# Patient Record
(demographics unavailable — no encounter records)

---

## 2024-10-18 NOTE — DISCUSSION/SUMMARY
[Reviewed Clinical Lab Test(s)] : Results of clinical tests were reviewed. [Reviewed Radiology Report(s)] : Radiology reports were reviewed. [Reviewed Radiology Film/Image(s)] : Images from radiology studies were reviewed and examined. [Discuss Alternatives/Risks/Benefits w/Patient] : All alternatives, risks, and benefits were discussed with the patient/family and all questions were answered.  Patient expressed good understanding and appreciates the importance of follow up as recommended. [FreeTextEntry1] : Stage IVB uterine adenocarcinoma s/p x6 cycles of carbo/taxol/dostarlimab with plan for maintenace dostarlimab. Recent perforated appendix s/p oral antibiotic course and  laparoscopic appendectomy 04/25/24.   [] Continue dostarlimab 1000mg q6 weeks  [] Labs taken today [] CT C/A/P to evaluate response to treatment  A1C and thyroid function repeated in preparation with Dr. Griffith next week. TSH elevated last lab draw

## 2024-10-18 NOTE — HISTORY OF PRESENT ILLNESS
[FreeTextEntry1] : Problem 1) Endometrioid Adenocarcinoma G3 of the cervix or uterus (clinically suspicious for cervical cancer), stage IVB 5/2023     a) PD-L1 Interpretation: Negative    b) MSI-H MLH1 : Loss of nuclear expression  MSH2 :  Intact nuclear expression  MSH6:  Intact nuclear expression  PMS2 : Loss of nuclear expression   Previous Treatment 1) Pelvic US 3/23/23    a) Uterus 7cm with two small myomas noted EMS 9mm    b) R adnexal mass near right ovary 5.3cm   2) Cervical biopsy 3/29/23    a) Endometrioid adenocarcinoma, FIGO G3 unable to determine primary cervical versus uterine  3) PET/CT 4/19/23   a) Intense hypermetabolic activity in the cervix and uterus, whicih are enlarged and irregular on CT imaged. Activity in cervix and uterus is inseparable from sigmoid colon. Air in uterine cavity concerning for fistula formation between uterus./cervix and bowel   b) Hypermetabolic complicated bilateral adnexal cysts c/w malignancy   c) Hypermetabolic bilateral pelvic and retroperitoneal lymphadenopathy c/ w mets * L hydronephrosis and hydroureter   d) Hypermetabolic peritoneal nodules    e) hypermetabolic liver lesions   f) Multiple bilateral pulmonary nodules larger of which are hypermetabolic  4) Saw Dr Mccarty for L Hydronephrosis and Hydroureter (obsrevation)   5) Cisplatin, Paclitaxel, Avastin started 5/4/23     a) Taxol reaction - given very slowly per Allergy Dr. Lee    b) Changed to Carboplatin with C6 due to kidney issues    c) Good response on imaging after 6 cycles - plan to change to Carbo/Taxol/Dostarlimab  6) Pet/CT 7/14/23     a) Since prior 4/2023 pet ct scan, there is positive response to therapy involving the locally advanced primary gyn malignancy, metastatic LN, peritoneal carcinomatosis.      b) Left sided hydronephrosis and hydroureter is seen again slightly decreased in caliber     c) no evidence of new sites of systematic involvement in the rest of the body 7) CT C/A/P  9/15/23    a) Slight interval decrease in size of pulmonary nodules, retroperitoneal and pelvic lymph nodes. Stable cervical tumor and peritoneal nodules.     b) new thickening of the bladder wall and several small foci of air within the bladder  8) Taxol/Carbo/Dostarlimab started 9/20/23 s/p 6 cycles 3/24 9) CT C/A/P 11/22/23 (After 3 cycles T/C/Dostarlimab)  1.  Pelvic lesion as described above; possible cervical/lower uterine segment neoplasm.(3.2 previously now 2.6cm)  2.  Indeterminate pulmonary nodules; cannot exclude metastasis, particularly on the largest lesion. (Decreased on AA comparison, 1.5, 1.3, 1.5, 1.2cm previously now 1.1, 5mm, 6mm, 1.2cm) 3.  Left periaortic adenopathy, suspicious for metastasis (1.8cm previously now 1.1cm)  4.  Anterior and lateral bladder wall thickening with a midline 6 mm bladder polypoid lesion; recommend direct visualization to exclude a neoplasm. 10) CT C/A/P 3/18/24   a) Perforated appendicitis with suspected developing pericecal abscess. Since November 22, 2023, slightly decreased retrocervical lesion. Slightly decreased pulmonary nodule in right upper lobe. Stable retroperitoneal lymphadenopathy. Stable indeterminate right renal lesion. 11) Perforated appendix 3/24 treated with IV antibioitics  12) Appendectomy with Dr. Osman 4/25/24 13) Dostarlimab maintenance 5/21/24 to present 14) CTAP 8/30/24    a) Enlarging retrocervical space lesion Decreasing pulmonary nodule Less prominent left periaortic lymph node. Again seen is an apparent polyp in the urinary bladder. 15) PET/CT 9/20/24    a) 1. Since 7/13/2023, decrease in size and increase in uptake at the mass at the posterior uterine body, SUV max 19.4, consistent with known malignancy. No FDG avid retrocervical mass is identified. 2. New focal uptake within gastric wall at the proximal greater curvature of the stomach, SUV max 5.5, probably representing metastatic disease. 3. Decrease in size and uptake of an FDG avid left periaortic node, most likely representing metastatic disease. 4. Persistent uptake in a 1.2 cm right breast nodule. Further evaluation dedicated breast imaging is recommended. 5. Focal uptake at the posterior elements of L4-L5, most likely degenerative in nature. 16)  Endoscopy with biopsies 9/26/24   a) H.pylori 17) Diagnostic mammogram and biopsy R 10/11/24    a) Fibroadenoma  Here for follow up on maintenance dostarlimab. Cr 2.2 10/11/24 up from baseline of 1.5-1.9 Feeling well. Bowel/bladder habits at baseline. Has no side effects from dostarlimab. Is gaining weight even though she reports decreased appetite .

## 2024-10-18 NOTE — REVIEW OF SYSTEMS
[Negative] : Musculoskeletal [Neuropathy] : neuropathy [Fatigue] : fatigue [FreeTextEntry2] : slight numbness/tingling in both feet [FreeTextEntry7] : Overall 15lb weight loss over the course of chemo d/t loss of appetite. Fatigue improving.

## 2024-10-18 NOTE — PHYSICAL EXAM
[Chaperone Present] : A chaperone was present in the examining room during all aspects of the physical examination [Normal] : Recto-Vaginal Exam: Normal [de-identified] : soft, NT/ND. Incisions c/d/i. [de-identified] : Minimal tenderness to palpation at RLQ [Restricted in physically strenuous activity but ambulatory and able to carry out work of a light or sedentary nature] : Status 1- Restricted in physically strenuous activity but ambulatory and able to carry out work of a light or sedentary nature, e.g., light house work, office work

## 2024-10-28 NOTE — HISTORY OF PRESENT ILLNESS
[FreeTextEntry1] : 58y F with PMH of HTN, DM, hypothyroidism and stage IV B endometrial adenocarcinoma G3 from the cervix or uterus with residual CKD presents for initial evaluation. At the present time, denies active complaints such as CP, SOB, N/V/D, dizziness, lightheadiness, dysuria, hematuria, fever or chills. No recent visits to the adult ER or hospital admissions reported in the last 2 weeks. No new medications started in the last 2 weeks either.   With regards to renal hx, denies family hx of renal disease, hx of nephrolithiasis, hx of CKD or OTC use.   With regards to renal function, SCr trend has been fluctuating since 2023 between 1.03-1.4 with GFR between 40-55% with most recent labs showing SCR at 2.21 and GFR at 25%.

## 2024-10-28 NOTE — PHYSICAL EXAM
[General Appearance - Alert] : alert [Strabismus] : no strabismus was seen [Hearing Threshold Finger Rub Not [place]] : hearing was normal [Neck Cervical Mass (___cm)] : no neck mass was observed [Jugular Venous Distention Increased] : there was no jugular-venous distention [Exaggerated Use Of Accessory Muscles For Inspiration] : no accessory muscle use [Auscultation Breath Sounds / Voice Sounds] : lungs were clear to auscultation bilaterally [Heart Sounds] : normal S1 and S2 [Heart Sounds Gallop] : no gallops [Murmurs] : no murmurs [Edema] : there was no peripheral edema [Bowel Sounds] : normal bowel sounds [Abdomen Soft] : soft [No CVA Tenderness] : no ~M costovertebral angle tenderness [No Focal Deficits] : no focal deficits [Oriented To Time, Place, And Person] : oriented to person, place, and time

## 2024-10-28 NOTE — ASSESSMENT
[FreeTextEntry1] : 1. Residual CKD possibly related to HTN associated nephrosclerosis vs DM nephropathy vs recurrent episodes of MAURILIO (hx of hydronephrosis vs platinum induced MAURILIO)  Patient was started on Cisplatin, Paclitaxel, Avastin started 5/4/23 then she was changed to carboplatin due to episodes of MAURILIO  (C6) New regiment since then Carbo/Taxol/Dostarlimab During Oncology course, she developed L hydronephrosis and then perforates appendicitis with pericecal abscess  Since May 2024 she has been on maintenance Dostarlimab.  At that time, SCR was 1.41 and GFR at 43% with fluctuations noted since then. Most recent labs showed SCr at 2.21 and GFR at 25% She was sent to the adult ER for IVF ~ with no changes on renal function compared to most recent labs. Based on review of home medications, she was taking PPI. PPI and ICPI both could potentially increase the risk for AIN which could be a possibility. However, there is no UA at present to determine if there is sterile pyuria or hematuria.  She has proteinuria however she also has DM.  Will repeat labs today to monitor renal function and include UA, new UPCR, new UACR. She has a 24hr collection showing proteinuria of 418mg/g These medications can be associated with a variety of glomerular diseases such as IgA nephropathy, membranous nephropathy, FSGS, C3GN, and minimal change disease and they are most commonly associated with ATIN/ AIN Recommend holding Metformin PPI, ARB and ICPI If renal function does not improve or worsens with next set of labs ~ will likely need a renal biopsy to rule out ICPI-associated MAURILIO   2. HTN - BP at goal  BP regiment:  Valsartan 40 MG daily C/w BP check ups at home Will hold ARB for now and start CCB, nifedipine 60mg daily since the patient has MAURILIO   3. DM - will need to be transition to an alternative antihypoglycemic agent Once she recovers from MAURILIO ~ could potentially benefit from kerendia or SGLT2-i   4. RTC w/ labs

## 2024-11-11 NOTE — PHYSICAL EXAM
[Chaperone Present] : A chaperone was present in the examining room during all aspects of the physical examination [Normal] : No focal neurologic defects observed [Restricted in physically strenuous activity but ambulatory and able to carry out work of a light or sedentary nature] : Status 1- Restricted in physically strenuous activity but ambulatory and able to carry out work of a light or sedentary nature, e.g., light house work, office work [de-identified] : soft, excoriation marks on the lateral abdomen, no specific rash seen

## 2024-11-11 NOTE — HISTORY OF PRESENT ILLNESS
[FreeTextEntry1] : Problem 1) Endometrioid Adenocarcinoma G3 of the cervix or uterus (clinically suspicious for cervical cancer), stage IVB 5/2023     a) PD-L1 Interpretation: Negative    b) MSI-H MLH1 : Loss of nuclear expression  MSH2 :  Intact nuclear expression  MSH6:  Intact nuclear expression  PMS2 : Loss of nuclear expression   Previous Treatment 1) Pelvic US 3/23/23    a) Uterus 7cm with two small myomas noted EMS 9mm    b) R adnexal mass near right ovary 5.3cm   2) Cervical biopsy 3/29/23    a) Endometrioid adenocarcinoma, FIGO G3 unable to determine primary cervical versus uterine  3) PET/CT 4/19/23   a) Intense hypermetabolic activity in the cervix and uterus, whicih are enlarged and irregular on CT imaged. Activity in cervix and uterus is inseparable from sigmoid colon. Air in uterine cavity concerning for fistula formation between uterus./cervix and bowel   b) Hypermetabolic complicated bilateral adnexal cysts c/w malignancy   c) Hypermetabolic bilateral pelvic and retroperitoneal lymphadenopathy c/ w mets * L hydronephrosis and hydroureter   d) Hypermetabolic peritoneal nodules    e) hypermetabolic liver lesions   f) Multiple bilateral pulmonary nodules larger of which are hypermetabolic  4) Saw Dr Mccarty for L Hydronephrosis and Hydroureter (obsrevation)   5) Cisplatin, Paclitaxel, Avastin started 5/4/23     a) Taxol reaction - given very slowly per Allergy Dr. Lee    b) Changed to Carboplatin with C6 due to kidney issues    c) Good response on imaging after 6 cycles - plan to change to Carbo/Taxol/Dostarlimab  6) Pet/CT 7/14/23     a) Since prior 4/2023 pet ct scan, there is positive response to therapy involving the locally advanced primary gyn malignancy, metastatic LN, peritoneal carcinomatosis.      b) Left sided hydronephrosis and hydroureter is seen again slightly decreased in caliber     c) no evidence of new sites of systematic involvement in the rest of the body 7) CT C/A/P  9/15/23    a) Slight interval decrease in size of pulmonary nodules, retroperitoneal and pelvic lymph nodes. Stable cervical tumor and peritoneal nodules.     b) new thickening of the bladder wall and several small foci of air within the bladder  8) Taxol/Carbo/Dostarlimab started 9/20/23 s/p 6 cycles 3/24 9) CT C/A/P 11/22/23 (After 3 cycles T/C/Dostarlimab)  1.  Pelvic lesion as described above; possible cervical/lower uterine segment neoplasm.(3.2 previously now 2.6cm)  2.  Indeterminate pulmonary nodules; cannot exclude metastasis, particularly on the largest lesion. (Decreased on AA comparison, 1.5, 1.3, 1.5, 1.2cm previously now 1.1, 5mm, 6mm, 1.2cm) 3.  Left periaortic adenopathy, suspicious for metastasis (1.8cm previously now 1.1cm)  4.  Anterior and lateral bladder wall thickening with a midline 6 mm bladder polypoid lesion; recommend direct visualization to exclude a neoplasm. 10) CT C/A/P 3/18/24   a) Perforated appendicitis with suspected developing pericecal abscess. Since November 22, 2023, slightly decreased retrocervical lesion. Slightly decreased pulmonary nodule in right upper lobe. Stable retroperitoneal lymphadenopathy. Stable indeterminate right renal lesion. 11) Perforated appendix 3/24 treated with IV antibioitics  12) Appendectomy with Dr. Osman 4/25/24 13) Dostarlimab maintenance 5/21/24 to present 14) CTAP 8/30/24    a) Enlarging retrocervical space lesion Decreasing pulmonary nodule Less prominent left periaortic lymph node. Again seen is an apparent polyp in the urinary bladder. 15) PET/CT 9/20/24    a) 1. Since 7/13/2023, decrease in size and increase in uptake at the mass at the posterior uterine body, SUV max 19.4, consistent with known malignancy. No FDG avid retrocervical mass is identified. 2. New focal uptake within gastric wall at the proximal greater curvature of the stomach, SUV max 5.5, probably representing metastatic disease. 3. Decrease in size and uptake of an FDG avid left periaortic node, most likely representing metastatic disease. 4. Persistent uptake in a 1.2 cm right breast nodule. Further evaluation dedicated breast imaging is recommended. 5. Focal uptake at the posterior elements of L4-L5, most likely degenerative in nature. 16)  Endoscopy with biopsies 9/26/24   a) H.pylori 17) Diagnostic mammogram and biopsy R 10/11/24    a) Fibroadenoma  Here for follow up due to pruritis. Patient has had itching without a rash on her abdomen and arms for about 2 weeks. No improvement with Zyrtec. Some relief with benadryl. Dostarlimab currently on hold for nephrology work up. Tested positive for UTI 11/1, did not receive treatment yet.

## 2024-11-11 NOTE — DISCUSSION/SUMMARY
[Reviewed Clinical Lab Test(s)] : Results of clinical tests were reviewed. [Reviewed Radiology Report(s)] : Radiology reports were reviewed. [Reviewed Radiology Film/Image(s)] : Images from radiology studies were reviewed and examined. [Discuss Alternatives/Risks/Benefits w/Patient] : All alternatives, risks, and benefits were discussed with the patient/family and all questions were answered.  Patient expressed good understanding and appreciates the importance of follow up as recommended. [FreeTextEntry1] : Stage IVB uterine adenocarcinoma s/p x6 cycles of carbo/taxol/dostarlimab with plan for maintenance dostarlimab. Recent perforated appendix s/p oral antibiotic course and laparoscopic appendectomy 04/25/24.   [] Hold dostarlimab 1000mg q6 weeks until cleared by Dr. Gurdeep Lockwood, follow-up appointment 11/18/24. Patient travelling 11/28/24-01/04/25, will plan for a cycle of dostarlimab prior to travel if possible [] Dermatology referral [] Macrobid 100mg BID for 3 days sent to pharmacy [] Return to clinic after travels 01/2025

## 2024-11-13 NOTE — HISTORY OF PRESENT ILLNESS
[FreeTextEntry1] : NPV- itching [de-identified] : Marleny Deluca 58 y/o F presents for itching on her abdomen.  -itchy red skin only on abdomen. started 2 weeks ago. Referred by gyn/onc because she is on maintenance dostarlimab (PDL-1 inhibitor) q6w (since 5/2024- present) for endometrioid adenocarcinoma.  was started on nifedipine 60mg daily on 10/28/24 by her nephrologist Dr Gurdeep Lockwood (switched from valsartan) due to MAURILIO.   Tried: Cetirizine doesn't help, Benadryl only helps for 5 minutes.   Moisturizer: bianca lotion Applies perfumes on neck  Also applying "skin oil" - not helping.   Personal history of skin cancer: No Family history of skin cancer: No History of blistering sunburns: No History of tanning bed use: No Uses sunscreen regularly: No

## 2024-11-13 NOTE — ASSESSMENT
[FreeTextEntry1] : #New onset pruritus with eczematous skin changes , <10% BSA clinical differential is broad.   bloodwork: TSH includes:  -eczematous dermatitis (late onset atopic, dry skin, or allergic contact) -pruritus from medication: Nifedipine and other CCB can cause new onset pruritus an eczema, however there is usually more of a time lag (weeks to months) between starting medication and onset of rash.  -cutaneous toxicity from CPI.  Since symptoms are limited in area, skin involvement would be considered grade 1/2  -pruritus secondary to systemic dz such as worsening of known CKD -urticarial pemphigoid  since symptoms are mild and area of involvement is currently limited, I recommend empirical tx w/ sensitive skin care, topical emollients, antihistamines, and topical steroids.  then follow up in clinic to monitor  -counselled her on the following (written handout provided): start using topical emollient such as CeraVe moisturizing cream, CeraVe healing ointment, or Aquaphor frequently throughout the day. Instructed her to always apply right after shower.  STOP Haritha (has fragrance), perfumes, and skin oil that  she is currently using.   -start gentle fragrance free liquid body wash in shower -avoid fragrances/perfumes or any scented lotions/soaps -start TAC 0.1% ointment bid for two weeks on and two weeks off as needed for itching. -start hydroxyzine 25mg qHS. SED including lethargy and drowsiness, cautioned her about risk of mechanical fall. advised her to take 1/2 tablet if full tablet too sedating.  -increase cetirizine to 10mg bid as needed for itching -follow up in 4-6 weeks to monitor response. advised her to follow up sooner if there is acute worsening or spreading of her symptoms. If symptoms are not controlled, can evaluate further with skin biopsy for H&E and DIF and additional bloodwork to workup pruritus of unknown origin.

## 2024-11-13 NOTE — HISTORY OF PRESENT ILLNESS
[FreeTextEntry1] : NPV- itching [de-identified] : Marleny Deluca 58 y/o F presents for itching on her abdomen.  -itchy red skin only on abdomen. started 2 weeks ago. Referred by gyn/onc because she is on maintenance dostarlimab (PDL-1 inhibitor) q6w (since 5/2024- present) for endometrioid adenocarcinoma.  was started on nifedipine 60mg daily on 10/28/24 by her nephrologist Dr Gurdeep Lockwood (switched from valsartan) due to MAURILIO.   Tried: Cetirizine doesn't help, Benadryl only helps for 5 minutes.   Moisturizer: bianca lotion Applies perfumes on neck  Also applying "skin oil" - not helping.   Personal history of skin cancer: No Family history of skin cancer: No History of blistering sunburns: No History of tanning bed use: No Uses sunscreen regularly: No

## 2024-11-18 NOTE — PHYSICAL EXAM
[General Appearance - Alert] : alert [Strabismus] : no strabismus was seen [Hearing Threshold Finger Rub Not [place]] : hearing was normal [Neck Cervical Mass (___cm)] : no neck mass was observed [Jugular Venous Distention Increased] : there was no jugular-venous distention [Exaggerated Use Of Accessory Muscles For Inspiration] : no accessory muscle use [Auscultation Breath Sounds / Voice Sounds] : lungs were clear to auscultation bilaterally [Heart Sounds] : normal S1 and S2 [Heart Sounds Gallop] : no gallops [Murmurs] : no murmurs [Bowel Sounds] : normal bowel sounds [Abdomen Soft] : soft [Abdomen Tenderness] : non-tender [No Focal Deficits] : no focal deficits [Oriented To Time, Place, And Person] : oriented to person, place, and time

## 2024-11-18 NOTE — PHYSICAL EXAM
[General Appearance - Alert] : alert [Strabismus] : no strabismus was seen [Hearing Threshold Finger Rub Not [place]] : hearing was normal [Neck Cervical Mass (___cm)] : no neck mass was observed [Jugular Venous Distention Increased] : there was no jugular-venous distention [Exaggerated Use Of Accessory Muscles For Inspiration] : no accessory muscle use [Auscultation Breath Sounds / Voice Sounds] : lungs were clear to auscultation bilaterally [Heart Sounds] : normal S1 and S2 [Heart Sounds Gallop] : no gallops [Murmurs] : no murmurs [Bowel Sounds] : normal bowel sounds [Abdomen Soft] : soft [Abdomen Tenderness] : non-tender [No Focal Deficits] : no focal deficits [Oriented To Time, Place, And Person] : oriented to person, place, and time

## 2024-11-18 NOTE — HISTORY OF PRESENT ILLNESS
[FreeTextEntry1] : 58y F with PMH of HTN, DM, hypothyroidism and stage IV B endometrial adenocarcinoma G3 from the cervix or uterus with residual CKD presents for follow up today. At the present time, denies active complaints such as CP, SOB, N/V/D, dizziness, lightheadiness, dysuria, hematuria, fever or chills. No recent visits to the adult ER or hospital admissions reported in the last 2 weeks. No new medications started in the last 2 weeks either.  With regards to renal function, SCr trend has been fluctuating since 2023 between 1.03-1.4 with GFR between 40-55%.   New labs done before this visit showed E. Coli UTI pansensitive and New SCR at 2.26 and GFR at 24 % with cystatin C levels at 2.1 and GFR at 27%

## 2024-11-18 NOTE — ASSESSMENT
[FreeTextEntry1] : 1. Non oliguric MAURILIO possibly sec to E. Coli UTI - UC showed pansensitive E.coli UTI treated with macrobid (per patient completed treatment) Will repeat BMP, UA and cystatin C levels today   2. Residual CKD possibly related to HTN associated nephrosclerosis vs DM nephropathy vs recurrent episodes of MAURILIO (hx of hydronephrosis vs platinum induced MAURILIO) Patient was started on Cisplatin, Paclitaxel, Avastin started 5/4/23 then she was changed to carboplatin due to episodes of MAURILIO (C6) New regiment since then Carbo/Taxol/Dostarlimab During Oncology course, she developed L hydronephrosis and then perforated appendicitis with pericecal abscess Since May 2024 she has been on maintenance Dostarlimab. At that time, SCR was 1.41 and GFR at 43% with fluctuations noted since then.  She was sent to the adult ER for IVF ~ with no changes on renal function compared to most recent labs. Based on review of home medications, she was taking PPI. PPI and ICPI both could potentially increase the risk for AIN which could be a possibility.  She has proteinuria however she also has DM. New labs done on Oct 2024 showed  SCr at 2.26 and GFR at 24% with pansensitive E.Coli UTI  Previously recommended holding Metformin PPI, ARB and ICPI - needs to continue holding these agents Will repeat Labs today after successful completion of UTI treatment.  If there are no changes/ improvement with next set of labs ~ will likely need a renal biopsy to rule out ICPI-associated MAURILIO  2. HTN - BP at goal BP regiment: Valsartan 40 MG daily C/w BP check ups at home Will hold ARB for now and start CCB, nifedipine 60mg daily since the patient has MAURILIO  3. DM - off metformin since Oct 2024 Previous recommend being transition to an alternative antihypoglycemic agent ~ however it was not done Will contact PCP again   4. RTC w/ labs.

## 2024-11-20 NOTE — PLAN
[FreeTextEntry1] : reviewed labs:  kidney function slight improvement (cmp)  bacteria/ infection in urine cleared (UA) anemia stable (cbc) a1c 7.3 (increased) encouraged cont with low fat diet, low sugar/ low carb diet, exercise  tsh wnl, cont meds   bp stable-- cont low salt diet, exercise  diabetes  elevated a1c  will add januvia since stopped metformin (elevated kidney fxn, still high)  will adjust med for kidney function  follow up in 2-3 months, sooner if needed   discussed case with nephro, will connect with nephro again

## 2025-01-07 NOTE — HISTORY OF PRESENT ILLNESS
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 Recent perforated appendix s/p oral antibiotic course and laparoscopic appendectomy 04/25/24.  GFR: 39, Creatine: 1.54 Patient was started on levothyroxine in 02/2024  Most recent dose is 50 mcg   Symptoms:  -Normal energy  -Normal bowel movements  -No hair loss  -No recent weight changes  -No tremors  -No HP  -No heat intolerance   Diabetes New Patient HPI   HPI:  Duration of Diabetes:  1 year        Is patient on Insulin?          No     List Current Medications for Glycemic control and the doses: 1-          Sitagliptin 50 mg daily  2-       3-          SMBG (self monitored blood glucose) readings:  - Name of glucometer:           - How often does the patient check BG?  No            - Does the patient keep a log?              Does patient get Hypoglycemic episodes?    No             Diabetic Complications: Is patient aware of having any of those complications? - Eyes: Retinopathy?                   	When was the last fully dilated eye exam?     No         - Feet: 	Neuropathy?   No                  	Foot Ulcers?          	When was the last time patient saw a Podiatrist?            Diet: review patient's diet:       She is careful with her rice intake.  More focused on protein and vegetables    Exercise: review patient exercise habits:                   Walking

## 2025-01-07 NOTE — ASSESSMENT
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  Currently on immunotherapy- Dostarlimab   Hypothyroidism: -Check TFTs  -Continue Levothyroxine 50 mcg daily  -Clinically hypothyroid   DM Type 2:  -Will order freestyle park 3 for BG monitoring -Continue Sitagliptin 50 mg daily  -HgA1C is 7.3%  -Carb controlled diet recommended  -Will need updated optho exam   Follow up in 4-5 months

## 2025-01-07 NOTE — REVIEW OF SYSTEMS
[Fatigue] : no fatigue [Decreased Appetite] : appetite not decreased [Visual Field Defect] : no visual field defect [Dysphagia] : no dysphagia [Dysphonia] : no dysphonia [Chest Pain] : no chest pain [Palpitations] : no palpitations [Shortness Of Breath] : no shortness of breath [Cough] : no cough [Nausea] : no nausea [Constipation] : no constipation [Vomiting] : no vomiting [Diarrhea] : no diarrhea [Polyuria] : no polyuria [Irregular Menses] : regular menses [Joint Pain] : no joint pain [Acanthosis] : no acanthosis  [Headaches] : no headaches [Tremors] : no tremors [Depression] : no depression [Polydipsia] : no polydipsia [Cold Intolerance] : no cold intolerance [Easy Bleeding] : no ~M tendency for easy bleeding [Easy Bruising] : no tendency for easy bruising

## 2025-01-13 NOTE — PHYSICAL EXAM
[Chaperone Present] : A chaperone was present in the examining room during all aspects of the physical examination [Normal] : No focal neurologic defects observed [Restricted in physically strenuous activity but ambulatory and able to carry out work of a light or sedentary nature] : Status 1- Restricted in physically strenuous activity but ambulatory and able to carry out work of a light or sedentary nature, e.g., light house work, office work [de-identified] : soft, excoriation marks on the lateral abdomen, no specific rash seen

## 2025-01-13 NOTE — DISCUSSION/SUMMARY
[Reviewed Clinical Lab Test(s)] : Results of clinical tests were reviewed. [Reviewed Radiology Report(s)] : Radiology reports were reviewed. [Reviewed Radiology Film/Image(s)] : Images from radiology studies were reviewed and examined. [Discuss Alternatives/Risks/Benefits w/Patient] : All alternatives, risks, and benefits were discussed with the patient/family and all questions were answered.  Patient expressed good understanding and appreciates the importance of follow up as recommended. [FreeTextEntry1] : Stage IVB uterine adenocarcinoma s/p x6 cycles of carbo/taxol/dostarlimab with plan for maintenance dostarlimab. Perforated appendix s/p oral antibiotic course and laparoscopic appendectomy 04/25/24. Recently returned from trip to [facility]. Clinically B[facility].  [] Prechemo labs today [] Maintenance dostarlimab 01/15/25 [] Return to clinic in 6 weeks [] Plan for colonoscopy before next cycle

## 2025-01-13 NOTE — DISCUSSION/SUMMARY
[Reviewed Clinical Lab Test(s)] : Results of clinical tests were reviewed. [Reviewed Radiology Report(s)] : Radiology reports were reviewed. [Reviewed Radiology Film/Image(s)] : Images from radiology studies were reviewed and examined. [Discuss Alternatives/Risks/Benefits w/Patient] : All alternatives, risks, and benefits were discussed with the patient/family and all questions were answered.  Patient expressed good understanding and appreciates the importance of follow up as recommended. [FreeTextEntry1] : Stage IVB uterine adenocarcinoma s/p x6 cycles of carbo/taxol/dostarlimab with plan for maintenance dostarlimab. Perforated appendix s/p oral antibiotic course and laparoscopic appendectomy 04/25/24. Recently returned from trip to [facility]. Clinically B[facility]DAN.  [] Prechemo labs today [] Maintenance dostarlimab 01/15/25 [] Return to clinic in 6 weeks [] Plan for colonoscopy before next cycle

## 2025-01-13 NOTE — HISTORY OF PRESENT ILLNESS
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 Activity in cervix and uterus is inseparable from sigmoid colon. Air in uterine cavity concerning for fistula formation between uterus./cervix and bowel   b) Hypermetabolic complicated bilateral adnexal cysts c/w malignancy   c) Hypermetabolic bilateral pelvic and retroperitoneal lymphadenopathy c/ w mets * L hydronephrosis and hydroureter   d) Hypermetabolic peritoneal nodules    e) hypermetabolic liver lesions   f) Multiple bilateral pulmonary nodules larger of which are hypermetabolic  4) Saw Dr Mccarty for L Hydronephrosis and Hydroureter (obsrevation)   5) Cisplatin, Paclitaxel, Avastin started 5/4/23     a) Taxol reaction - given very slowly per Allergy Dr. Lee    b) Changed to Carboplatin with C6 due to kidney issues    c) Good response on imaging after 6 cycles - plan to change to Carbo/Taxol/Dostarlimab  6) Pet/CT 7/14/23     a) Since prior 4/2023 pet ct scan, there is positive response to therapy involving the locally advanced primary gyn malignancy, metastatic LN, peritoneal carcinomatosis.      b) Left sided hydronephrosis and hydroureter is seen again slightly decreased in caliber     c) no evidence of new sites of systematic involvement in the rest of the body 7) CT C/A/P  9/15/23    a) Slight interval decrease in size of pulmonary nodules, retroperitoneal and pelvic lymph nodes. Stable cervical tumor and peritoneal nodules.     b) new thickening of the bladder wall and several small foci of air within the bladder  8) Taxol/Carbo/Dostarlimab started 9/20/23 s/p 6 cycles 3/24 9) CT C/A/P 11/22/23 (After 3 cycles T/C/Dostarlimab)  1.  Pelvic lesion as described above; possible cervical/lower uterine segment neoplasm.(3.2 previously now 2.6cm)  2.  Indeterminate pulmonary nodules; cannot exclude metastasis, particularly on the largest lesion. (Decreased on AA comparison, 1.5, 1.3, 1.5, 1.2cm previously now 1.1, 5mm, 6mm, 1.2cm) 3.  Left periaortic adenopathy, suspicious for metastasis (1.8cm previously now 1.1cm)  4.  Anterior and lateral bladder wall thickening with a midline 6 mm bladder polypoid lesion; recommend direct visualization to exclude a neoplasm. 10) CT C/A/P 3/18/24   a) Perforated appendicitis with suspected developing pericecal abscess. Since November 22, 2023, slightly decreased retrocervical lesion. Slightly decreased pulmonary nodule in right upper lobe. Stable retroperitoneal lymphadenopathy. Stable indeterminate right renal lesion. 11) Perforated appendix 3/24 treated with IV antibioitics  12) Appendectomy with Dr. Osman 4/25/24 13) Dostarlimab maintenance 5/21/24 to present 14) CTAP 8/30/24    a) Enlarging retrocervical space lesion Decreasing pulmonary nodule Less prominent left periaortic lymph node. Again seen is an apparent polyp in the urinary bladder. 15) PET/CT 9/20/24    a) 1. Since 7/13/2023, decrease in size and increase in uptake at the mass at the posterior uterine body, SUV max 19.4, consistent with known malignancy. No FDG avid retrocervical mass is identified. 2. New focal uptake within gastric wall at the proximal greater curvature of the stomach, SUV max 5.5, probably representing metastatic disease. 3. Decrease in size and uptake of an FDG avid left periaortic node, most likely representing metastatic disease. 4. Persistent uptake in a 1.2 cm right breast nodule. Further evaluation dedicated breast imaging is recommended. 5. Focal uptake at the posterior elements of L4-L5, most likely degenerative in nature. 16)  Endoscopy with biopsies 9/26/24   a) H.pylori 17) Diagnostic mammogram and biopsy R 10/11/24    a) Fibroadenoma  Here for follow up after returning from traveling to [facility]. Last maintenance dostarlimab 11/27. Treatment delayed due to increased creatinine, with normalization back to baseline. Saw dermatology in November for eczematous rash, treated with topical steroid. Patient feels well. Denies pain or bleeding.

## 2025-01-13 NOTE — PHYSICAL EXAM
[Chaperone Present] : A chaperone was present in the examining room during all aspects of the physical examination [Normal] : No focal neurologic defects observed [Restricted in physically strenuous activity but ambulatory and able to carry out work of a light or sedentary nature] : Status 1- Restricted in physically strenuous activity but ambulatory and able to carry out work of a light or sedentary nature, e.g., light house work, office work [de-identified] : soft, excoriation marks on the lateral abdomen, no specific rash seen

## 2025-01-13 NOTE — HISTORY OF PRESENT ILLNESS
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 Activity in cervix and uterus is inseparable from sigmoid colon. Air in uterine cavity concerning for fistula formation between uterus./cervix and bowel   b) Hypermetabolic complicated bilateral adnexal cysts c/w malignancy   c) Hypermetabolic bilateral pelvic and retroperitoneal lymphadenopathy c/ w mets * L hydronephrosis and hydroureter   d) Hypermetabolic peritoneal nodules    e) hypermetabolic liver lesions   f) Multiple bilateral pulmonary nodules larger of which are hypermetabolic  4) Saw Dr Mccarty for L Hydronephrosis and Hydroureter (obsrevation)   5) Cisplatin, Paclitaxel, Avastin started 5/4/23     a) Taxol reaction - given very slowly per Allergy Dr. Lee    b) Changed to Carboplatin with C6 due to kidney issues    c) Good response on imaging after 6 cycles - plan to change to Carbo/Taxol/Dostarlimab  6) Pet/CT 7/14/23     a) Since prior 4/2023 pet ct scan, there is positive response to therapy involving the locally advanced primary gyn malignancy, metastatic LN, peritoneal carcinomatosis.      b) Left sided hydronephrosis and hydroureter is seen again slightly decreased in caliber     c) no evidence of new sites of systematic involvement in the rest of the body 7) CT C/A/P  9/15/23    a) Slight interval decrease in size of pulmonary nodules, retroperitoneal and pelvic lymph nodes. Stable cervical tumor and peritoneal nodules.     b) new thickening of the bladder wall and several small foci of air within the bladder  8) Taxol/Carbo/Dostarlimab started 9/20/23 s/p 6 cycles 3/24 9) CT C/A/P 11/22/23 (After 3 cycles T/C/Dostarlimab)  1.  Pelvic lesion as described above; possible cervical/lower uterine segment neoplasm.(3.2 previously now 2.6cm)  2.  Indeterminate pulmonary nodules; cannot exclude metastasis, particularly on the largest lesion. (Decreased on AA comparison, 1.5, 1.3, 1.5, 1.2cm previously now 1.1, 5mm, 6mm, 1.2cm) 3.  Left periaortic adenopathy, suspicious for metastasis (1.8cm previously now 1.1cm)  4.  Anterior and lateral bladder wall thickening with a midline 6 mm bladder polypoid lesion; recommend direct visualization to exclude a neoplasm. 10) CT C/A/P 3/18/24   a) Perforated appendicitis with suspected developing pericecal abscess. Since November 22, 2023, slightly decreased retrocervical lesion. Slightly decreased pulmonary nodule in right upper lobe. Stable retroperitoneal lymphadenopathy. Stable indeterminate right renal lesion. 11) Perforated appendix 3/24 treated with IV antibioitics  12) Appendectomy with Dr. Osman 4/25/24 13) Dostarlimab maintenance 5/21/24 to present 14) CTAP 8/30/24    a) Enlarging retrocervical space lesion Decreasing pulmonary nodule Less prominent left periaortic lymph node. Again seen is an apparent polyp in the urinary bladder. 15) PET/CT 9/20/24    a) 1. Since 7/13/2023, decrease in size and increase in uptake at the mass at the posterior uterine body, SUV max 19.4, consistent with known malignancy. No FDG avid retrocervical mass is identified. 2. New focal uptake within gastric wall at the proximal greater curvature of the stomach, SUV max 5.5, probably representing metastatic disease. 3. Decrease in size and uptake of an FDG avid left periaortic node, most likely representing metastatic disease. 4. Persistent uptake in a 1.2 cm right breast nodule. Further evaluation dedicated breast imaging is recommended. 5. Focal uptake at the posterior elements of L4-L5, most likely degenerative in nature. 16)  Endoscopy with biopsies 9/26/24   a) H.pylori 17) Diagnostic mammogram and biopsy R 10/11/24    a) Fibroadenoma  Here for follow up after returning from traveling to [facility]. Last maintenance dostarlimab 11/27. Treatment delayed due to increased creatinine, with normalization back to baseline. Saw dermatology in November for eczematous rash, treated with topical steroid. Patient feels well. Denies pain or bleeding.

## 2025-01-14 NOTE — PHYSICAL EXAM
[Normal Conjunctiva] : normal conjunctiva [Normal Venous Pressure] : normal venous pressure [No Carotid Bruit] : no carotid bruit [Normal S1, S2] : normal S1, S2 [Normal] : clear lung fields, good air entry, no respiratory distress [Soft] : abdomen soft [Non Tender] : non-tender [Normal Gait] : normal gait [No Edema] : no edema [No Rash] : no rash [Moves all extremities] : moves all extremities [Alert and Oriented] : alert and oriented [de-identified] : 1/6 SHIRAZ to neck

## 2025-02-24 NOTE — HISTORY OF PRESENT ILLNESS
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 Activity in cervix and uterus is inseparable from sigmoid colon. Air in uterine cavity concerning for fistula formation between uterus./cervix and bowel   b) Hypermetabolic complicated bilateral adnexal cysts c/w malignancy   c) Hypermetabolic bilateral pelvic and retroperitoneal lymphadenopathy c/ w mets * L hydronephrosis and hydroureter   d) Hypermetabolic peritoneal nodules    e) hypermetabolic liver lesions   f) Multiple bilateral pulmonary nodules larger of which are hypermetabolic  4) Saw Dr Mccarty for L Hydronephrosis and Hydroureter (obsrevation)   5) Cisplatin, Paclitaxel, Avastin started 5/4/23     a) Taxol reaction - given very slowly per Allergy Dr. Lee    b) Changed to Carboplatin with C6 due to kidney issues    c) Good response on imaging after 6 cycles - plan to change to Carbo/Taxol/Dostarlimab  6) Pet/CT 7/14/23     a) Since prior 4/2023 pet ct scan, there is positive response to therapy involving the locally advanced primary gyn malignancy, metastatic LN, peritoneal carcinomatosis.      b) Left sided hydronephrosis and hydroureter is seen again slightly decreased in caliber     c) no evidence of new sites of systematic involvement in the rest of the body 7) CT C/A/P  9/15/23    a) Slight interval decrease in size of pulmonary nodules, retroperitoneal and pelvic lymph nodes. Stable cervical tumor and peritoneal nodules.     b) new thickening of the bladder wall and several small foci of air within the bladder  8) Taxol/Carbo/Dostarlimab started 9/20/23 s/p 6 cycles 3/24 9) CT C/A/P 11/22/23 (After 3 cycles T/C/Dostarlimab)  1.  Pelvic lesion as described above; possible cervical/lower uterine segment neoplasm.(3.2 previously now 2.6cm)  2.  Indeterminate pulmonary nodules; cannot exclude metastasis, particularly on the largest lesion. (Decreased on AA comparison, 1.5, 1.3, 1.5, 1.2cm previously now 1.1, 5mm, 6mm, 1.2cm) 3.  Left periaortic adenopathy, suspicious for metastasis (1.8cm previously now 1.1cm)  4.  Anterior and lateral bladder wall thickening with a midline 6 mm bladder polypoid lesion; recommend direct visualization to exclude a neoplasm. 10) CT C/A/P 3/18/24   a) Perforated appendicitis with suspected developing pericecal abscess. Since November 22, 2023, slightly decreased retrocervical lesion. Slightly decreased pulmonary nodule in right upper lobe. Stable retroperitoneal lymphadenopathy. Stable indeterminate right renal lesion. 11) Perforated appendix 3/24 treated with IV antibioitics  12) Appendectomy with Dr. Osman 4/25/24 13) Dostarlimab maintenance 5/21/24 to present 14) CTAP 8/30/24    a) Enlarging retrocervical space lesion Decreasing pulmonary nodule Less prominent left periaortic lymph node. Again seen is an apparent polyp in the urinary bladder. 15) PET/CT 9/20/24    a) 1. Since 7/13/2023, decrease in size and increase in uptake at the mass at the posterior uterine body, SUV max 19.4, consistent with known malignancy. No FDG avid retrocervical mass is identified. 2. New focal uptake within gastric wall at the proximal greater curvature of the stomach, SUV max 5.5, probably representing metastatic disease. 3. Decrease in size and uptake of an FDG avid left periaortic node, most likely representing metastatic disease. 4. Persistent uptake in a 1.2 cm right breast nodule. Further evaluation dedicated breast imaging is recommended. 5. Focal uptake at the posterior elements of L4-L5, most likely degenerative in nature. 16)  Endoscopy with biopsies 9/26/24   a) H.pylori 17) Diagnostic mammogram and biopsy R 10/11/24    a) Fibroadenoma  Here for follow up, on maintenance dostarlimab, next dose due 2/27. Patient did not do colonoscopy yet as her sister was unwell with the flu, she's now better and patient intends to make an appointment soon. Denies pain or bleeding, changes in urine or bowel habits.

## 2025-02-24 NOTE — DISCUSSION/SUMMARY
[Reviewed Clinical Lab Test(s)] : Results of clinical tests were reviewed. [Reviewed Radiology Report(s)] : Radiology reports were reviewed. [Reviewed Radiology Film/Image(s)] : Images from radiology studies were reviewed and examined. [Discuss Alternatives/Risks/Benefits w/Patient] : All alternatives, risks, and benefits were discussed with the patient/family and all questions were answered.  Patient expressed good understanding and appreciates the importance of follow up as recommended. [FreeTextEntry1] : 60 year old Stage IVB uterine adenocarcinoma s/p x6 cycles of carbo/taxol/dostarlimab with plan for maintenance dostarlimab. Perforated appendix s/p oral antibiotic course and laparoscopic appendectomy 04/25/24. Recently returned from trip to [facility]. Clinically BOGDAN.  [] Prechemo labs today [] Maintenance dostarlimab 2/27/25 [] PET scan in March [] Return to clinic in 6 weeks

## 2025-02-24 NOTE — PHYSICAL EXAM
[Chaperone Present] : A chaperone was present in the examining room during all aspects of the physical examination [Normal] : No focal neurologic defects observed [Restricted in physically strenuous activity but ambulatory and able to carry out work of a light or sedentary nature] : Status 1- Restricted in physically strenuous activity but ambulatory and able to carry out work of a light or sedentary nature, e.g., light house work, office work [FreeTextEntry2] : Desire [de-identified] : soft, excoriation marks on the lateral abdomen, no specific rash seen

## 2025-02-24 NOTE — PHYSICAL EXAM
[Chaperone Present] : A chaperone was present in the examining room during all aspects of the physical examination [Normal] : No focal neurologic defects observed [Restricted in physically strenuous activity but ambulatory and able to carry out work of a light or sedentary nature] : Status 1- Restricted in physically strenuous activity but ambulatory and able to carry out work of a light or sedentary nature, e.g., light house work, office work [FreeTextEntry2] : Desire [de-identified] : soft, excoriation marks on the lateral abdomen, no specific rash seen

## 2025-02-24 NOTE — DISCUSSION/SUMMARY
[Reviewed Clinical Lab Test(s)] : Results of clinical tests were reviewed. [Reviewed Radiology Report(s)] : Radiology reports were reviewed. [Reviewed Radiology Film/Image(s)] : Images from radiology studies were reviewed and examined. [Discuss Alternatives/Risks/Benefits w/Patient] : All alternatives, risks, and benefits were discussed with the patient/family and all questions were answered.  Patient expressed good understanding and appreciates the importance of follow up as recommended. [FreeTextEntry1] : 60 year old Stage IVB uterine adenocarcinoma s/p x6 cycles of carbo/taxol/dostarlimab with plan for maintenance dostarlimab. Perforated appendix s/p oral antibiotic course and laparoscopic appendectomy 04/25/24. Recently returned from trip to [facility]. Clinically BOGDAN.  [] Prechemo labs today [] Maintenance dostarlimab 2/27/25 [] PET scan in March [] Return to clinic in 6 weeks

## 2025-03-04 NOTE — PHYSICAL EXAM
[General Appearance - Alert] : alert [General Appearance - In No Acute Distress] : in no acute distress [Sclera] : the sclera and conjunctiva were normal [PERRL With Normal Accommodation] : pupils were equal in size, round, and reactive to light [Extraocular Movements] : extraocular movements were intact [Outer Ear] : the ears and nose were normal in appearance [Oropharynx] : the oropharynx was normal [Neck Appearance] : the appearance of the neck was normal [Neck Cervical Mass (___cm)] : no neck mass was observed [Jugular Venous Distention Increased] : there was no jugular-venous distention [Thyroid Diffuse Enlargement] : the thyroid was not enlarged [Thyroid Nodule] : there were no palpable thyroid nodules [Respiration, Rhythm And Depth] : normal respiratory rhythm and effort [Exaggerated Use Of Accessory Muscles For Inspiration] : no accessory muscle use [Auscultation Breath Sounds / Voice Sounds] : lungs were clear to auscultation bilaterally [Heart Rate And Rhythm] : heart rate was normal and rhythm regular [Heart Sounds] : normal S1 and S2 [Heart Sounds Gallop] : no gallops [Murmurs] : no murmurs [Heart Sounds Pericardial Friction Rub] : no pericardial rub [Edema] : there was no peripheral edema [Veins - Varicosity Changes] : there were no varicosital changes [Bowel Sounds] : normal bowel sounds [Abdomen Soft] : soft [Abdomen Tenderness] : non-tender [Abdomen Mass (___ Cm)] : no abdominal mass palpated [No CVA Tenderness] : no ~M costovertebral angle tenderness [No Spinal Tenderness] : no spinal tenderness [Abnormal Walk] : normal gait [Involuntary Movements] : no involuntary movements were seen [Skin Color & Pigmentation] : normal skin color and pigmentation [Skin Turgor] : normal skin turgor [] : no rash [Cranial Nerves] : cranial nerves 2-12 were intact [No Focal Deficits] : no focal deficits [Affect] : the affect was normal [Mood] : the mood was normal

## 2025-03-04 NOTE — HISTORY OF PRESENT ILLNESS
[FreeTextEntry1] : Patient is a 59 yo F with HTN, DM, hypothyroidism, stage IVIB endometrial adenocarcinoma G3 from the cervix or uterus with residual CKD presents for follow up today  Since last seen has been doing well, has allergies and has not been walking because it was cold but will get back to it now  Reviewed labs in detail, stable sCr at new baseline from november 1.4-1.5, staying hydrated, drinking green juice. Acidosis continues and has worsened, will start sodium bicarbonate

## 2025-03-04 NOTE — ASSESSMENT
[FreeTextEntry1] : 2. Residual CKD possibly related to HTN associated nephrosclerosis vs DM nephropathy vs recurrent episodes of MAURILIO (hx of hydronephrosis vs platinum induced MAURILIO) Patient was started on Cisplatin, Paclitaxel, Avastin started 5/4/23 then she was changed to carboplatin due to episodes of MAURILIO (C6) New regiment since then Carbo/Taxol/Dostarlimab During Oncology course, she developed L hydronephrosis and then perforated appendicitis with pericecal abscess Since May 2024 she has been on maintenance Dostarlimab. At that time, SCR was 1.41 and GFR at 43% with fluctuations noted since then. She was sent to the adult ER for IVF ~ with no changes on renal function compared to most recent labs. Based on review of home medications, she was taking PPI. PPI and ICPI both could potentially increase the risk for AIN which could be a possibility.  2. HTN - Controlled on nifedipine, continue without raasi for now  3. DM - off metformin since Oct 2024 Previous recommend being transition to an alternative antihypoglycemic agent ~ however it was not done Will contact PCP again  4. RTC w/ labs.

## 2025-05-20 NOTE — PHYSICAL EXAM
[Normal] : No focal neurologic defects observed [Restricted in physically strenuous activity but ambulatory and able to carry out work of a light or sedentary nature] : Status 1- Restricted in physically strenuous activity but ambulatory and able to carry out work of a light or sedentary nature, e.g., light house work, office work [FreeTextEntry2] : Desire [de-identified] : soft, excoriation marks on the lateral abdomen, no specific rash seen Yes

## 2025-05-20 NOTE — DISCUSSION/SUMMARY
[Reviewed Clinical Lab Test(s)] : Results of clinical tests were reviewed. [Reviewed Radiology Report(s)] : Radiology reports were reviewed. [Reviewed Radiology Film/Image(s)] : Images from radiology studies were reviewed and examined. [Discuss Alternatives/Risks/Benefits w/Patient] : All alternatives, risks, and benefits were discussed with the patient/family and all questions were answered.  Patient expressed good understanding and appreciates the importance of follow up as recommended. [FreeTextEntry1] : 60 year old Stage IVB uterine adenocarcinoma s/p x6 cycles of carbo/taxol/dostarlimab now on maintenance C2 dostarlimab. Perforated appendix s/p oral antibiotic course and laparoscopic appendectomy 04/25/24. Recently returned from trip to [facility]. Findings of most recent CT scan are stable, no need for PETCT at this time.   [] Prechemo labs today [] Maintenance dostarlimab C2 5/23/25 [] Return to clinic in 6 weeks

## 2025-05-20 NOTE — PHYSICAL EXAM
[Normal] : No focal neurologic defects observed [Restricted in physically strenuous activity but ambulatory and able to carry out work of a light or sedentary nature] : Status 1- Restricted in physically strenuous activity but ambulatory and able to carry out work of a light or sedentary nature, e.g., light house work, office work [FreeTextEntry2] : Desire [de-identified] : soft, excoriation marks on the lateral abdomen, no specific rash seen

## 2025-05-20 NOTE — PHYSICAL EXAM
[Normal] : No focal neurologic defects observed [Restricted in physically strenuous activity but ambulatory and able to carry out work of a light or sedentary nature] : Status 1- Restricted in physically strenuous activity but ambulatory and able to carry out work of a light or sedentary nature, e.g., light house work, office work [FreeTextEntry2] : Desire [de-identified] : soft, excoriation marks on the lateral abdomen, no specific rash seen

## 2025-05-20 NOTE — HISTORY OF PRESENT ILLNESS
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 Activity in cervix and uterus is inseparable from sigmoid colon. Air in uterine cavity concerning for fistula formation between uterus./cervix and bowel   b) Hypermetabolic complicated bilateral adnexal cysts c/w malignancy   c) Hypermetabolic bilateral pelvic and retroperitoneal lymphadenopathy c/ w mets * L hydronephrosis and hydroureter   d) Hypermetabolic peritoneal nodules    e) hypermetabolic liver lesions   f) Multiple bilateral pulmonary nodules larger of which are hypermetabolic  4) Saw Dr Mccarty for L Hydronephrosis and Hydroureter (obsrevation)   5) Cisplatin, Paclitaxel, Avastin started 5/4/23     a) Taxol reaction - given very slowly per Allergy Dr. Lee    b) Changed to Carboplatin with C6 due to kidney issues    c) Good response on imaging after 6 cycles - plan to change to Carbo/Taxol/Dostarlimab  6) Pet/CT 7/14/23     a) Since prior 4/2023 pet ct scan, there is positive response to therapy involving the locally advanced primary gyn malignancy, metastatic LN, peritoneal carcinomatosis.      b) Left sided hydronephrosis and hydroureter is seen again slightly decreased in caliber     c) no evidence of new sites of systematic involvement in the rest of the body 7) CT C/A/P  9/15/23    a) Slight interval decrease in size of pulmonary nodules, retroperitoneal and pelvic lymph nodes. Stable cervical tumor and peritoneal nodules.     b) new thickening of the bladder wall and several small foci of air within the bladder  8) Taxol/Carbo/Dostarlimab started 9/20/23 s/p 6 cycles 3/24 9) CT C/A/P 11/22/23 (After 3 cycles T/C/Dostarlimab)  1.  Pelvic lesion as described above; possible cervical/lower uterine segment neoplasm.(3.2 previously now 2.6cm)  2.  Indeterminate pulmonary nodules; cannot exclude metastasis, particularly on the largest lesion. (Decreased on AA comparison, 1.5, 1.3, 1.5, 1.2cm previously now 1.1, 5mm, 6mm, 1.2cm) 3.  Left periaortic adenopathy, suspicious for metastasis (1.8cm previously now 1.1cm)  4.  Anterior and lateral bladder wall thickening with a midline 6 mm bladder polypoid lesion; recommend direct visualization to exclude a neoplasm. 10) CT C/A/P 3/18/24   a) Perforated appendicitis with suspected developing pericecal abscess. Since November 22, 2023, slightly decreased retrocervical lesion. Slightly decreased pulmonary nodule in right upper lobe. Stable retroperitoneal lymphadenopathy. Stable indeterminate right renal lesion. 11) Perforated appendix 3/24 treated with IV antibioitics  12) Appendectomy with Dr. Osman 4/25/24 13) Dostarlimab maintenance 5/21/24 to present 14) CTAP 8/30/24    a) Enlarging retrocervical space lesion Decreasing pulmonary nodule Less prominent left periaortic lymph node. Again seen is an apparent polyp in the urinary bladder. 15) PET/CT 9/20/24    a) 1. Since 7/13/2023, decrease in size and increase in uptake at the mass at the posterior uterine body, SUV max 19.4, consistent with known malignancy. No FDG avid retrocervical mass is identified. 2. New focal uptake within gastric wall at the proximal greater curvature of the stomach, SUV max 5.5, probably representing metastatic disease. 3. Decrease in size and uptake of an FDG avid left periaortic node, most likely representing metastatic disease. 4. Persistent uptake in a 1.2 cm right breast nodule. Further evaluation dedicated breast imaging is recommended. 5. Focal uptake at the posterior elements of L4-L5, most likely degenerative in nature. 16)  Endoscopy with biopsies 9/26/24   a) H.pylori 17) Diagnostic mammogram and biopsy R 10/11/24    a) Fibroadenoma 18) CT C/A/P 3/23/25   a) Since September 20, 2024, unchanged residual pulmonary and abdominal nazia metastases.    b) Since April 19, 2023, mild increased right renal lesion, in keeping with small cystic and solid cortical renal neoplasm.  Here for follow up, on maintenance dostarlimab, last dose 4/11/25. CT scan with stable disease and increase in R renal lesion, cystic and solid. C2 dostarlimab 5/23/2025. Patient has had no nausea/vomiting/diarrhea/appetite, regular urination and bowel movements, no change in neuropathy since stopping chemotherapy. Feels it in her feet intermittently, no residual balance issues. Denies bleeding/pain.

## 2025-05-20 NOTE — DISCUSSION/SUMMARY
[Reviewed Clinical Lab Test(s)] : Results of clinical tests were reviewed. [Reviewed Radiology Report(s)] : Radiology reports were reviewed. [Reviewed Radiology Film/Image(s)] : Images from radiology studies were reviewed and examined. [Discuss Alternatives/Risks/Benefits w/Patient] : All alternatives, risks, and benefits were discussed with the patient/family and all questions were answered.  Patient expressed good understanding and appreciates the importance of follow up as recommended. [FreeTextEntry1] : 60 year old Stage IVB uterine adenocarcinoma s/p x6 cycles of carbo/taxol/dostarlimab now on maintenance C2 dostarlimab. Perforated appendix s/p oral antibiotic course and laparoscopic appendectomy 04/25/24. Recently returned from trip to [facility]. Findings of most recent CT scan are stable, no need for PETCT at this time.   [] Prechemo labs today [] Maintenance dostarlimab C2 5/23/25 [] Return to clinic in 6 weeks

## 2025-05-20 NOTE — HISTORY OF PRESENT ILLNESS
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 Activity in cervix and uterus is inseparable from sigmoid colon. Air in uterine cavity concerning for fistula formation between uterus./cervix and bowel   b) Hypermetabolic complicated bilateral adnexal cysts c/w malignancy   c) Hypermetabolic bilateral pelvic and retroperitoneal lymphadenopathy c/ w mets * L hydronephrosis and hydroureter   d) Hypermetabolic peritoneal nodules    e) hypermetabolic liver lesions   f) Multiple bilateral pulmonary nodules larger of which are hypermetabolic  4) Saw Dr Mccarty for L Hydronephrosis and Hydroureter (obsrevation)   5) Cisplatin, Paclitaxel, Avastin started 5/4/23     a) Taxol reaction - given very slowly per Allergy Dr. Lee    b) Changed to Carboplatin with C6 due to kidney issues    c) Good response on imaging after 6 cycles - plan to change to Carbo/Taxol/Dostarlimab  6) Pet/CT 7/14/23     a) Since prior 4/2023 pet ct scan, there is positive response to therapy involving the locally advanced primary gyn malignancy, metastatic LN, peritoneal carcinomatosis.      b) Left sided hydronephrosis and hydroureter is seen again slightly decreased in caliber     c) no evidence of new sites of systematic involvement in the rest of the body 7) CT C/A/P  9/15/23    a) Slight interval decrease in size of pulmonary nodules, retroperitoneal and pelvic lymph nodes. Stable cervical tumor and peritoneal nodules.     b) new thickening of the bladder wall and several small foci of air within the bladder  8) Taxol/Carbo/Dostarlimab started 9/20/23 s/p 6 cycles 3/24 9) CT C/A/P 11/22/23 (After 3 cycles T/C/Dostarlimab)  1.  Pelvic lesion as described above; possible cervical/lower uterine segment neoplasm.(3.2 previously now 2.6cm)  2.  Indeterminate pulmonary nodules; cannot exclude metastasis, particularly on the largest lesion. (Decreased on AA comparison, 1.5, 1.3, 1.5, 1.2cm previously now 1.1, 5mm, 6mm, 1.2cm) 3.  Left periaortic adenopathy, suspicious for metastasis (1.8cm previously now 1.1cm)  4.  Anterior and lateral bladder wall thickening with a midline 6 mm bladder polypoid lesion; recommend direct visualization to exclude a neoplasm. 10) CT C/A/P 3/18/24   a) Perforated appendicitis with suspected developing pericecal abscess. Since November 22, 2023, slightly decreased retrocervical lesion. Slightly decreased pulmonary nodule in right upper lobe. Stable retroperitoneal lymphadenopathy. Stable indeterminate right renal lesion. 11) Perforated appendix 3/24 treated with IV antibioitics  12) Appendectomy with Dr. Osman 4/25/24 13) Dostarlimab maintenance 5/21/24 to present 14) CTAP 8/30/24    a) Enlarging retrocervical space lesion Decreasing pulmonary nodule Less prominent left periaortic lymph node. Again seen is an apparent polyp in the urinary bladder. 15) PET/CT 9/20/24    a) 1. Since 7/13/2023, decrease in size and increase in uptake at the mass at the posterior uterine body, SUV max 19.4, consistent with known malignancy. No FDG avid retrocervical mass is identified. 2. New focal uptake within gastric wall at the proximal greater curvature of the stomach, SUV max 5.5, probably representing metastatic disease. 3. Decrease in size and uptake of an FDG avid left periaortic node, most likely representing metastatic disease. 4. Persistent uptake in a 1.2 cm right breast nodule. Further evaluation dedicated breast imaging is recommended. 5. Focal uptake at the posterior elements of L4-L5, most likely degenerative in nature. 16)  Endoscopy with biopsies 9/26/24   a) H.pylori 17) Diagnostic mammogram and biopsy R 10/11/24    a) Fibroadenoma 18) CT C/A/P 3/23/25   a) Since September 20, 2024, unchanged residual pulmonary and abdominal nazia metastases.    b) Since April 19, 2023, mild increased right renal lesion, in keeping with small cystic and solid cortical renal neoplasm.  Here for follow up, on maintenance dostarlimab, last dose 4/11/25. CT scan with stable disease and increase in R renal lesion, cystic and solid. C2 dostarlimab 5/23/2025. Patient has had no nausea/vomiting/diarrhea/appetite, regular urination and bowel movements, no change in neuropathy since stopping chemotherapy. Feels it in her feet intermittently, no residual balance issues. Denies bleeding/pain.  DISPLAY PLAN FREE TEXT

## 2025-05-23 NOTE — ASSESSMENT
[FreeTextEntry1] : 2. Residual CKD possibly related to HTN associated nephrosclerosis vs DM nephropathy vs recurrent episodes of MAURILIO (hx of hydronephrosis vs platinum induced MAURILIO) Patient was started on Cisplatin, Paclitaxel, Avastin started 5/4/23 then she was changed to carboplatin due to episodes of MAURILIO (C6) New regiment since then Carbo/Taxol/Dostarlimab During Oncology course, she developed L hydronephrosis and then perforated appendicitis with pericecal abscess Since May 2024 she has been on maintenance Dostarlimab. At that time, SCR was 1.41 and GFR at 43% with fluctuations noted since then. She was sent to the adult ER for IVF ~ with no changes on renal function compared to most recent labs. Based on review of home medications, she was taking PPI. PPI and ICPI both could potentially increase the risk for AIN which could be a possibility.  2. HTN - Controlled on nifedipine, continue without raasi for now  3. DM - off metformin since Oct 2024 A1C again elevated, will stop eating rice   4. RTC w/ labs.

## 2025-05-23 NOTE — HISTORY OF PRESENT ILLNESS
[FreeTextEntry1] : Patient is a 61 yo F with HTN, DM, hypothyroidism, stage IVIB endometrial adenocarcinoma G3 from the cervix or uterus with residual CKD presents for follow up today  Since last seen has been doing well, has allergies and has not been walking because it was cold but will get back to it now  Reviewed labs in detail, stable sCr at new baseline from november 1.4-1.5, staying hydrated, drinking green juice. Acidosis is stable at least cloer to goal, sCr stable A1C went up quite a bit, will need major change in lifestyle, stopped eating rice for a couple days

## 2025-06-11 NOTE — HISTORY OF PRESENT ILLNESS
[FreeTextEntry1] : follow up  [de-identified] : 61 yo f presents to discuss diabetes  recent a1c with nephro was 8.1 (up from 6.9)  uncontrolled on current regimen

## 2025-06-11 NOTE — PLAN
[FreeTextEntry1] : bp stable-- cont low salt diet, exercise  diabetes  uncontrolled elevated a1c (8.1 from 6.9)  will add jardiance, on januvia as well since stopped metformin (elevated kidney fxn, still high)  follow up in 2-3 months, sooner if needed   discussed case with nephro and endo

## 2025-06-25 NOTE — ASSESSMENT
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  Currently on immunotherapy- Dostarlimab   Hypothyroidism: -Check TFTs  -Continue Levothyroxine 50 mcg  x 8 tabs weekly -Clinically hypothyroid   DM Type 2:  -Will order freestyle park 3 for BG monitoring -Continue Sitagliptin 50 mg daily  -Continue Jardiance 10 mg  -HgA1C is 7.3%  -Carb controlled diet recommended  -Will need updated optho exam   Follow up in 4-5 months

## 2025-06-25 NOTE — HISTORY OF PRESENT ILLNESS
[FreeTextEntry1] : 60-year-old F with PMH of HTN, DM, hypothyroidism and stage IV B endometrial adenocarcinoma G3 from the cervix or uterus with residual CKD presents for evaluation   Stage IVB uterine adenocarcinoma s/p x6 cycles of carbo/taxol/dostarlimab with plan for maintenance dostarlimab. Recent perforated appendix s/p oral antibiotic course and laparoscopic appendectomy 04/25/24.  Every 6 weeks she has been receiving immunotherapy   GFR: 39, Creatinine: 1.54 Patient was started on levothyroxine in 02/2024  Most recent dose is 50 mcg   Symptoms:  -Normal energy  -Normal bowel movements  -No hair loss  -No recent weight changes  -No tremors  -No HP  -No heat intolerance   Diabetes New Patient HPI   HPI:  Duration of Diabetes:  1 year        Is patient on Insulin?          No     List Current Medications for Glycemic control and the doses: 1-          Sitagliptin 50 mg daily  2-         Jardiance 10 mg daily  3-          SMBG (self monitored blood glucose) readings:  - Name of glucometer:           - How often does the patient check BG?  No            - Does the patient keep a log?              Does patient get Hypoglycemic episodes?    No             Diabetic Complications: Is patient aware of having any of those complications? - Eyes: Retinopathy?                   	When was the last fully dilated eye exam?     No         - Feet: 	Neuropathy?   No                  	Foot Ulcers?          	When was the last time patient saw a Podiatrist?            Diet: review patient's diet:       She is careful with her rice intake.  More focused on protein and vegetables    Exercise: review patient exercise habits:                   Walking

## 2025-06-25 NOTE — REVIEW OF SYSTEMS
[Fatigue] : no fatigue [Decreased Appetite] : appetite not decreased [Recent Weight Gain (___ Lbs)] : no recent weight gain [Recent Weight Loss (___ Lbs)] : no recent weight loss [Visual Field Defect] : no visual field defect [Dysphagia] : no dysphagia [Dysphonia] : no dysphonia [Chest Pain] : no chest pain [Palpitations] : no palpitations [Shortness Of Breath] : no shortness of breath [Nausea] : no nausea [Polyuria] : no polyuria [Irregular Menses] : regular menses [Joint Pain] : no joint pain [Acanthosis] : no acanthosis  [Headaches] : no headaches [Tremors] : no tremors [Depression] : no depression [Polydipsia] : no polydipsia [Cold Intolerance] : no cold intolerance [Easy Bleeding] : no ~M tendency for easy bleeding [Easy Bruising] : no tendency for easy bruising

## 2025-07-02 NOTE — HISTORY OF PRESENT ILLNESS
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 Activity in cervix and uterus is inseparable from sigmoid colon. Air in uterine cavity concerning for fistula formation between uterus./cervix and bowel   b) Hypermetabolic complicated bilateral adnexal cysts c/w malignancy   c) Hypermetabolic bilateral pelvic and retroperitoneal lymphadenopathy c/ w mets * L hydronephrosis and hydroureter   d) Hypermetabolic peritoneal nodules    e) hypermetabolic liver lesions   f) Multiple bilateral pulmonary nodules larger of which are hypermetabolic  4) Saw Dr Mccarty for L Hydronephrosis and Hydroureter (obsrevation)   5) Cisplatin, Paclitaxel, Avastin started 5/4/23     a) Taxol reaction - given very slowly per Allergy Dr. Lee    b) Changed to Carboplatin with C6 due to kidney issues    c) Good response on imaging after 6 cycles - plan to change to Carbo/Taxol/Dostarlimab  6) Pet/CT 7/14/23     a) Since prior 4/2023 pet ct scan, there is positive response to therapy involving the locally advanced primary gyn malignancy, metastatic LN, peritoneal carcinomatosis.      b) Left sided hydronephrosis and hydroureter is seen again slightly decreased in caliber     c) no evidence of new sites of systematic involvement in the rest of the body 7) CT C/A/P  9/15/23    a) Slight interval decrease in size of pulmonary nodules, retroperitoneal and pelvic lymph nodes. Stable cervical tumor and peritoneal nodules.     b) new thickening of the bladder wall and several small foci of air within the bladder  8) Taxol/Carbo/Dostarlimab started 9/20/23 s/p 6 cycles 3/24 9) CT C/A/P 11/22/23 (After 3 cycles T/C/Dostarlimab)  1.  Pelvic lesion as described above; possible cervical/lower uterine segment neoplasm.(3.2 previously now 2.6cm)  2.  Indeterminate pulmonary nodules; cannot exclude metastasis, particularly on the largest lesion. (Decreased on AA comparison, 1.5, 1.3, 1.5, 1.2cm previously now 1.1, 5mm, 6mm, 1.2cm) 3.  Left periaortic adenopathy, suspicious for metastasis (1.8cm previously now 1.1cm)  4.  Anterior and lateral bladder wall thickening with a midline 6 mm bladder polypoid lesion; recommend direct visualization to exclude a neoplasm. 10) CT C/A/P 3/18/24   a) Perforated appendicitis with suspected developing pericecal abscess. Since November 22, 2023, slightly decreased retrocervical lesion. Slightly decreased pulmonary nodule in right upper lobe. Stable retroperitoneal lymphadenopathy. Stable indeterminate right renal lesion. 11) Perforated appendix 3/24 treated with IV antibioitics  12) Appendectomy with Dr. Osman 4/25/24 13) Dostarlimab maintenance 5/21/24 to present 14) CTAP 8/30/24    a) Enlarging retrocervical space lesion Decreasing pulmonary nodule Less prominent left periaortic lymph node. Again seen is an apparent polyp in the urinary bladder. 15) PET/CT 9/20/24    a) 1. Since 7/13/2023, decrease in size and increase in uptake at the mass at the posterior uterine body, SUV max 19.4, consistent with known malignancy. No FDG avid retrocervical mass is identified. 2. New focal uptake within gastric wall at the proximal greater curvature of the stomach, SUV max 5.5, probably representing metastatic disease. 3. Decrease in size and uptake of an FDG avid left periaortic node, most likely representing metastatic disease. 4. Persistent uptake in a 1.2 cm right breast nodule. Further evaluation dedicated breast imaging is recommended. 5. Focal uptake at the posterior elements of L4-L5, most likely degenerative in nature. 16)  Endoscopy with biopsies 9/26/24   a) H.pylori 17) Diagnostic mammogram and biopsy R 10/11/24    a) Fibroadenoma 18) CT C/A/P 3/23/25   a) Since September 20, 2024, unchanged residual pulmonary and abdominal nazia metastases.    b) Since April 19, 2023, mild increased right renal lesion, in keeping with small cystic and solid cortical renal neoplasm.  Here for follow up, on maintenance dostarlimab, last dose 5/23/25. No pain or bleeding. No changes in bowel/bladder routine. No swelling. Last pre chemo labs, pt had elevated glucose. Pt followed up with  and . Added jardiance, to bhaveshuvia as well since stopped metformin (elevated kidney fxn, still high). Will follow up in 2-3 months, sooner if needed. Given freestyle libre3 for BG monitoring. Needs to follow up with ophthalmology.

## 2025-07-02 NOTE — PHYSICAL EXAM
[Normal] : No focal neurologic defects observed [Restricted in physically strenuous activity but ambulatory and able to carry out work of a light or sedentary nature] : Status 1- Restricted in physically strenuous activity but ambulatory and able to carry out work of a light or sedentary nature, e.g., light house work, office work [FreeTextEntry2] : Desire [de-identified] : soft, excoriation marks on the lateral abdomen, no specific rash seen

## 2025-07-02 NOTE — DISCUSSION/SUMMARY
[Reviewed Clinical Lab Test(s)] : Results of clinical tests were reviewed. [Reviewed Radiology Report(s)] : Radiology reports were reviewed. [Reviewed Radiology Film/Image(s)] : Images from radiology studies were reviewed and examined. [Discuss Alternatives/Risks/Benefits w/Patient] : All alternatives, risks, and benefits were discussed with the patient/family and all questions were answered.  Patient expressed good understanding and appreciates the importance of follow up as recommended. [FreeTextEntry1] : 60 year old Stage IVB uterine adenocarcinoma s/p x6 cycles of carbo/taxol/dostarlimab now on maintenance  dostarlimab. [] Prechemo labs today [] Maintenance dostarlimab 7/3/25 [] Return to clinic in 6 weeks

## 2025-07-02 NOTE — PHYSICAL EXAM
[Normal] : No focal neurologic defects observed [Restricted in physically strenuous activity but ambulatory and able to carry out work of a light or sedentary nature] : Status 1- Restricted in physically strenuous activity but ambulatory and able to carry out work of a light or sedentary nature, e.g., light house work, office work [FreeTextEntry2] : Desire [de-identified] : soft, excoriation marks on the lateral abdomen, no specific rash seen

## 2025-07-07 NOTE — PHYSICAL EXAM
[Normal Conjunctiva] : normal conjunctiva [Normal Venous Pressure] : normal venous pressure [No Carotid Bruit] : no carotid bruit [Normal S1, S2] : normal S1, S2 [Normal] : clear lung fields, good air entry, no respiratory distress [Soft] : abdomen soft [Non Tender] : non-tender [Normal Gait] : normal gait [No Edema] : no edema [No Rash] : no rash [Moves all extremities] : moves all extremities [Alert and Oriented] : alert and oriented [de-identified] : 1/6 SIHRAZ to neck